# Patient Record
Sex: MALE | Race: WHITE
[De-identification: names, ages, dates, MRNs, and addresses within clinical notes are randomized per-mention and may not be internally consistent; named-entity substitution may affect disease eponyms.]

---

## 2017-01-27 ENCOUNTER — HOSPITAL ENCOUNTER (OUTPATIENT)
Dept: HOSPITAL 47 - LABWHC1 | Age: 42
Discharge: HOME | End: 2017-01-27
Payer: COMMERCIAL

## 2017-01-27 DIAGNOSIS — E29.1: ICD-10-CM

## 2017-01-27 DIAGNOSIS — E11.65: Primary | ICD-10-CM

## 2017-01-27 LAB
ALP SERPL-CCNC: 50 U/L (ref 38–126)
ALT SERPL-CCNC: 92 U/L (ref 21–72)
ANION GAP SERPL CALC-SCNC: 11 MMOL/L
AST SERPL-CCNC: 46 U/L (ref 17–59)
BUN SERPL-SCNC: 20 MG/DL (ref 9–20)
CALCIUM SPEC-MCNC: 9.4 MG/DL (ref 8.4–10.2)
CH: 30.4
CHCM: 34.4
CHLORIDE SERPL-SCNC: 102 MMOL/L (ref 98–107)
CHOLEST SERPL-MCNC: 207 MG/DL (ref ?–200)
CO2 SERPL-SCNC: 25 MMOL/L (ref 22–30)
ERYTHROCYTE [DISTWIDTH] IN BLOOD BY AUTOMATED COUNT: 6.07 M/UL (ref 4.3–5.9)
ERYTHROCYTE [DISTWIDTH] IN BLOOD: 13.7 % (ref 11.5–15.5)
GLUCOSE SERPL-MCNC: 138 MG/DL (ref 74–99)
HCT VFR BLD AUTO: 54 % (ref 39–53)
HDLC SERPL-MCNC: 33 MG/DL (ref 40–60)
HDW: 2.98
HGB BLD-MCNC: 17.9 GM/DL (ref 13–17.5)
MCH RBC QN AUTO: 29.5 PG (ref 25–35)
MCHC RBC AUTO-ENTMCNC: 33.1 G/DL (ref 31–37)
MCV RBC AUTO: 89 FL (ref 80–100)
NON-AFRICAN AMERICAN GFR(MDRD): >60
POTASSIUM SERPL-SCNC: 4.9 MMOL/L (ref 3.5–5.1)
PROT SERPL-MCNC: 6.8 G/DL (ref 6.3–8.2)
PSA SERPL-MCNC: 1.46 NG/ML (ref 0–4)
SODIUM SERPL-SCNC: 138 MMOL/L (ref 137–145)
TRIGL SERPL-MCNC: 225 MG/DL (ref ?–150)
WBC # BLD AUTO: 7.5 K/UL (ref 3.8–10.6)

## 2017-01-27 PROCEDURE — 84153 ASSAY OF PSA TOTAL: CPT

## 2017-01-27 PROCEDURE — 84403 ASSAY OF TOTAL TESTOSTERONE: CPT

## 2017-01-27 PROCEDURE — 82043 UR ALBUMIN QUANTITATIVE: CPT

## 2017-01-27 PROCEDURE — 80053 COMPREHEN METABOLIC PANEL: CPT

## 2017-01-27 PROCEDURE — 85027 COMPLETE CBC AUTOMATED: CPT

## 2017-01-27 PROCEDURE — 36415 COLL VENOUS BLD VENIPUNCTURE: CPT

## 2017-01-27 PROCEDURE — 80061 LIPID PANEL: CPT

## 2017-07-05 ENCOUNTER — HOSPITAL ENCOUNTER (OUTPATIENT)
Dept: HOSPITAL 47 - LABWHC1 | Age: 42
Discharge: HOME | End: 2017-07-05
Payer: COMMERCIAL

## 2017-07-05 DIAGNOSIS — E11.65: Primary | ICD-10-CM

## 2017-07-05 LAB
ALP SERPL-CCNC: 53 U/L (ref 38–126)
ALT SERPL-CCNC: 100 U/L (ref 21–72)
ANION GAP SERPL CALC-SCNC: 8 MMOL/L
AST SERPL-CCNC: 43 U/L (ref 17–59)
BUN SERPL-SCNC: 15 MG/DL (ref 9–20)
CALCIUM SPEC-MCNC: 9.2 MG/DL (ref 8.4–10.2)
CHLORIDE SERPL-SCNC: 101 MMOL/L (ref 98–107)
CHOLEST SERPL-MCNC: 210 MG/DL (ref ?–200)
CO2 SERPL-SCNC: 30 MMOL/L (ref 22–30)
GLUCOSE SERPL-MCNC: 188 MG/DL (ref 74–99)
HDLC SERPL-MCNC: 37 MG/DL (ref 40–60)
NON-AFRICAN AMERICAN GFR(MDRD): >60
POTASSIUM SERPL-SCNC: 4.7 MMOL/L (ref 3.5–5.1)
PROT SERPL-MCNC: 6.8 G/DL (ref 6.3–8.2)
SODIUM SERPL-SCNC: 139 MMOL/L (ref 137–145)
TRIGL SERPL-MCNC: 240 MG/DL (ref ?–150)

## 2017-07-05 PROCEDURE — 80061 LIPID PANEL: CPT

## 2017-07-05 PROCEDURE — 36415 COLL VENOUS BLD VENIPUNCTURE: CPT

## 2017-07-05 PROCEDURE — 80053 COMPREHEN METABOLIC PANEL: CPT

## 2018-01-06 ENCOUNTER — HOSPITAL ENCOUNTER (OUTPATIENT)
Dept: HOSPITAL 47 - LABWHC1 | Age: 43
Discharge: HOME | End: 2018-01-06
Payer: COMMERCIAL

## 2018-01-06 DIAGNOSIS — E11.65: Primary | ICD-10-CM

## 2018-01-06 DIAGNOSIS — E29.1: ICD-10-CM

## 2018-01-06 LAB
ALBUMIN SERPL-MCNC: 4.3 G/DL (ref 3.5–5)
ALP SERPL-CCNC: 56 U/L (ref 38–126)
ALT SERPL-CCNC: 113 U/L (ref 21–72)
ANION GAP SERPL CALC-SCNC: 10 MMOL/L
AST SERPL-CCNC: 40 U/L (ref 17–59)
BUN SERPL-SCNC: 21 MG/DL (ref 9–20)
CALCIUM SPEC-MCNC: 9.8 MG/DL (ref 8.4–10.2)
CHLORIDE SERPL-SCNC: 97 MMOL/L (ref 98–107)
CHOLEST SERPL-MCNC: 185 MG/DL (ref ?–200)
CO2 SERPL-SCNC: 32 MMOL/L (ref 22–30)
ERYTHROCYTE [DISTWIDTH] IN BLOOD BY AUTOMATED COUNT: 5.78 M/UL (ref 4.3–5.9)
ERYTHROCYTE [DISTWIDTH] IN BLOOD: 13.8 % (ref 11.5–15.5)
GLUCOSE SERPL-MCNC: 296 MG/DL (ref 74–99)
HCT VFR BLD AUTO: 51.8 % (ref 39–53)
HDLC SERPL-MCNC: 29 MG/DL (ref 40–60)
HGB BLD-MCNC: 17.3 GM/DL (ref 13–17.5)
LDLC SERPL CALC-MCNC: 103 MG/DL (ref 0–99)
MCH RBC QN AUTO: 29.8 PG (ref 25–35)
MCHC RBC AUTO-ENTMCNC: 33.3 G/DL (ref 31–37)
MCV RBC AUTO: 89.5 FL (ref 80–100)
PLATELET # BLD AUTO: 290 K/UL (ref 150–450)
POTASSIUM SERPL-SCNC: 5.6 MMOL/L (ref 3.5–5.1)
PROT SERPL-MCNC: 6.8 G/DL (ref 6.3–8.2)
PSA SERPL-MCNC: 2.4 NG/ML (ref 0–4)
SODIUM SERPL-SCNC: 139 MMOL/L (ref 137–145)
TRIGL SERPL-MCNC: 267 MG/DL (ref ?–150)
WBC # BLD AUTO: 6.3 K/UL (ref 3.8–10.6)

## 2018-01-06 PROCEDURE — 80061 LIPID PANEL: CPT

## 2018-01-06 PROCEDURE — 82043 UR ALBUMIN QUANTITATIVE: CPT

## 2018-01-06 PROCEDURE — 36415 COLL VENOUS BLD VENIPUNCTURE: CPT

## 2018-01-06 PROCEDURE — 85027 COMPLETE CBC AUTOMATED: CPT

## 2018-01-06 PROCEDURE — 80053 COMPREHEN METABOLIC PANEL: CPT

## 2018-01-06 PROCEDURE — 82570 ASSAY OF URINE CREATININE: CPT

## 2018-01-06 PROCEDURE — 84153 ASSAY OF PSA TOTAL: CPT

## 2018-10-24 ENCOUNTER — HOSPITAL ENCOUNTER (OUTPATIENT)
Dept: HOSPITAL 47 - LABWHC1 | Age: 43
End: 2018-10-24
Payer: COMMERCIAL

## 2018-10-24 DIAGNOSIS — E11.65: Primary | ICD-10-CM

## 2018-10-24 DIAGNOSIS — E29.1: ICD-10-CM

## 2018-10-24 LAB
ALBUMIN SERPL-MCNC: 4.7 G/DL (ref 3.8–4.9)
ALBUMIN/GLOB SERPL: 2.24 G/DL (ref 1.2–2.1)
ALP SERPL-CCNC: 57 U/L (ref 41–126)
ALT SERPL-CCNC: 64 U/L (ref 10–49)
ANION GAP SERPL CALC-SCNC: 12.3 MMOL/L (ref 4–12)
AST SERPL-CCNC: 36 U/L (ref 14–35)
BUN SERPL-SCNC: 18 MG/DL (ref 9–27)
CALCIUM SPEC-MCNC: 9.7 MG/DL (ref 8.7–10.3)
CHLORIDE SERPL-SCNC: 98 MMOL/L (ref 96–109)
CHOLEST SERPL-MCNC: 300 MG/DL (ref 0–200)
CO2 SERPL-SCNC: 26.7 MMOL/L (ref 21.6–31.8)
ERYTHROCYTE [DISTWIDTH] IN BLOOD BY AUTOMATED COUNT: 5.87 M/UL (ref 4.3–5.9)
ERYTHROCYTE [DISTWIDTH] IN BLOOD: 14.5 % (ref 11.5–15.5)
GLOBULIN SER CALC-MCNC: 2.1 G/DL (ref 2.1–3.7)
GLUCOSE SERPL-MCNC: 234 MG/DL (ref 70–110)
HBA1C MFR BLD: 14.6 % (ref 4–6)
HCT VFR BLD AUTO: 52.4 % (ref 39–53)
HDLC SERPL-MCNC: 36 MG/DL (ref 40–60)
HGB BLD-MCNC: 17.8 GM/DL (ref 13–17.5)
MCH RBC QN AUTO: 30.3 PG (ref 25–35)
MCHC RBC AUTO-ENTMCNC: 33.9 G/DL (ref 31–37)
MCV RBC AUTO: 89.2 FL (ref 80–100)
PLATELET # BLD AUTO: 312 K/UL (ref 150–450)
POTASSIUM SERPL-SCNC: 5 MMOL/L (ref 3.5–5.5)
PROT SERPL-MCNC: 6.8 G/DL (ref 6.2–8.2)
SODIUM SERPL-SCNC: 137 MMOL/L (ref 135–145)
TRIGL SERPL-MCNC: 633 MG/DL (ref 0–149)
WBC # BLD AUTO: 6.6 K/UL (ref 3.8–10.6)

## 2018-10-24 PROCEDURE — 85027 COMPLETE CBC AUTOMATED: CPT

## 2018-10-24 PROCEDURE — 80053 COMPREHEN METABOLIC PANEL: CPT

## 2018-10-24 PROCEDURE — 36415 COLL VENOUS BLD VENIPUNCTURE: CPT

## 2018-10-24 PROCEDURE — 83721 ASSAY OF BLOOD LIPOPROTEIN: CPT

## 2018-10-24 PROCEDURE — 82570 ASSAY OF URINE CREATININE: CPT

## 2018-10-24 PROCEDURE — 82043 UR ALBUMIN QUANTITATIVE: CPT

## 2018-10-24 PROCEDURE — 80061 LIPID PANEL: CPT

## 2018-10-24 PROCEDURE — 83036 HEMOGLOBIN GLYCOSYLATED A1C: CPT

## 2018-10-24 PROCEDURE — 84403 ASSAY OF TOTAL TESTOSTERONE: CPT

## 2018-12-26 ENCOUNTER — HOSPITAL ENCOUNTER (OUTPATIENT)
Dept: HOSPITAL 47 - EC | Age: 43
Setting detail: OBSERVATION
LOS: 1 days | Discharge: HOME | End: 2018-12-27
Attending: FAMILY MEDICINE | Admitting: FAMILY MEDICINE
Payer: COMMERCIAL

## 2018-12-26 VITALS — BODY MASS INDEX: 30.1 KG/M2

## 2018-12-26 DIAGNOSIS — I10: ICD-10-CM

## 2018-12-26 DIAGNOSIS — E78.5: ICD-10-CM

## 2018-12-26 DIAGNOSIS — Z79.899: ICD-10-CM

## 2018-12-26 DIAGNOSIS — Z82.49: ICD-10-CM

## 2018-12-26 DIAGNOSIS — G25.81: ICD-10-CM

## 2018-12-26 DIAGNOSIS — E11.9: ICD-10-CM

## 2018-12-26 DIAGNOSIS — R07.89: Primary | ICD-10-CM

## 2018-12-26 DIAGNOSIS — E66.9: ICD-10-CM

## 2018-12-26 DIAGNOSIS — E29.1: ICD-10-CM

## 2018-12-26 DIAGNOSIS — F41.8: ICD-10-CM

## 2018-12-26 DIAGNOSIS — Z79.4: ICD-10-CM

## 2018-12-26 LAB
ALBUMIN SERPL-MCNC: 4.2 G/DL (ref 3.5–5)
ALP SERPL-CCNC: 61 U/L (ref 38–126)
ALT SERPL-CCNC: 57 U/L (ref 21–72)
ANION GAP SERPL CALC-SCNC: 13 MMOL/L
APTT BLD: 22.2 SEC (ref 22–30)
AST SERPL-CCNC: 27 U/L (ref 17–59)
BASOPHILS # BLD AUTO: 0.1 K/UL (ref 0–0.2)
BASOPHILS NFR BLD AUTO: 1 %
BUN SERPL-SCNC: 19 MG/DL (ref 9–20)
CALCIUM SPEC-MCNC: 9.7 MG/DL (ref 8.4–10.2)
CHLORIDE SERPL-SCNC: 102 MMOL/L (ref 98–107)
CK SERPL-CCNC: 107 U/L (ref 55–170)
CK SERPL-CCNC: 146 U/L (ref 55–170)
CO2 SERPL-SCNC: 23 MMOL/L (ref 22–30)
EOSINOPHIL # BLD AUTO: 0.1 K/UL (ref 0–0.7)
EOSINOPHIL NFR BLD AUTO: 2 %
ERYTHROCYTE [DISTWIDTH] IN BLOOD BY AUTOMATED COUNT: 5.52 M/UL (ref 4.3–5.9)
ERYTHROCYTE [DISTWIDTH] IN BLOOD: 14 % (ref 11.5–15.5)
GLUCOSE BLD-MCNC: 209 MG/DL (ref 75–99)
GLUCOSE BLD-MCNC: 306 MG/DL (ref 75–99)
GLUCOSE SERPL-MCNC: 343 MG/DL (ref 74–99)
HCT VFR BLD AUTO: 49.5 % (ref 39–53)
HGB BLD-MCNC: 16.5 GM/DL (ref 13–17.5)
INR PPP: 0.9 (ref ?–1.2)
LYMPHOCYTES # SPEC AUTO: 1.7 K/UL (ref 1–4.8)
LYMPHOCYTES NFR SPEC AUTO: 25 %
MAGNESIUM SPEC-SCNC: 2 MG/DL (ref 1.6–2.3)
MCH RBC QN AUTO: 29.9 PG (ref 25–35)
MCHC RBC AUTO-ENTMCNC: 33.3 G/DL (ref 31–37)
MCV RBC AUTO: 89.7 FL (ref 80–100)
MONOCYTES # BLD AUTO: 0.3 K/UL (ref 0–1)
MONOCYTES NFR BLD AUTO: 4 %
NEUTROPHILS # BLD AUTO: 4.7 K/UL (ref 1.3–7.7)
NEUTROPHILS NFR BLD AUTO: 66 %
PLATELET # BLD AUTO: 305 K/UL (ref 150–450)
POTASSIUM SERPL-SCNC: 4.6 MMOL/L (ref 3.5–5.1)
PROT SERPL-MCNC: 7 G/DL (ref 6.3–8.2)
PT BLD: 9.6 SEC (ref 9–12)
SODIUM SERPL-SCNC: 138 MMOL/L (ref 137–145)
TROPONIN I SERPL-MCNC: <0.012 NG/ML (ref 0–0.03)
TROPONIN I SERPL-MCNC: <0.012 NG/ML (ref 0–0.03)
WBC # BLD AUTO: 7 K/UL (ref 3.8–10.6)

## 2018-12-26 PROCEDURE — 36415 COLL VENOUS BLD VENIPUNCTURE: CPT

## 2018-12-26 PROCEDURE — 85730 THROMBOPLASTIN TIME PARTIAL: CPT

## 2018-12-26 PROCEDURE — 93351 STRESS TTE COMPLETE: CPT

## 2018-12-26 PROCEDURE — 96366 THER/PROPH/DIAG IV INF ADDON: CPT

## 2018-12-26 PROCEDURE — 96376 TX/PRO/DX INJ SAME DRUG ADON: CPT

## 2018-12-26 PROCEDURE — 93005 ELECTROCARDIOGRAM TRACING: CPT

## 2018-12-26 PROCEDURE — 85610 PROTHROMBIN TIME: CPT

## 2018-12-26 PROCEDURE — 84484 ASSAY OF TROPONIN QUANT: CPT

## 2018-12-26 PROCEDURE — 85025 COMPLETE CBC W/AUTO DIFF WBC: CPT

## 2018-12-26 PROCEDURE — 80053 COMPREHEN METABOLIC PANEL: CPT

## 2018-12-26 PROCEDURE — 82553 CREATINE MB FRACTION: CPT

## 2018-12-26 PROCEDURE — 99285 EMERGENCY DEPT VISIT HI MDM: CPT

## 2018-12-26 PROCEDURE — 71046 X-RAY EXAM CHEST 2 VIEWS: CPT

## 2018-12-26 PROCEDURE — 83735 ASSAY OF MAGNESIUM: CPT

## 2018-12-26 PROCEDURE — 93306 TTE W/DOPPLER COMPLETE: CPT

## 2018-12-26 PROCEDURE — 80061 LIPID PANEL: CPT

## 2018-12-26 PROCEDURE — 96365 THER/PROPH/DIAG IV INF INIT: CPT

## 2018-12-26 PROCEDURE — 82550 ASSAY OF CK (CPK): CPT

## 2018-12-26 RX ADMIN — HEPARIN SODIUM PRN UNIT: 5000 INJECTION, SOLUTION INTRAVENOUS; SUBCUTANEOUS at 21:43

## 2018-12-26 RX ADMIN — PRAMIPEXOLE DIHYDROCHLORIDE SCH MG: 0.5 TABLET ORAL at 21:12

## 2018-12-26 RX ADMIN — PRAMIPEXOLE DIHYDROCHLORIDE SCH: 0.5 TABLET ORAL at 16:37

## 2018-12-26 RX ADMIN — INSULIN ASPART SCH UNIT: 100 INJECTION, SOLUTION INTRAVENOUS; SUBCUTANEOUS at 16:50

## 2018-12-26 NOTE — P.HPIM
History of Present Illness


H&P Date: 12/26/18


Chief Complaint: Chest pain





43-year-old male with PMH of Dillon-Parkinson-White status post ablation at 16, 

hypertension, uncontrolled diabetes mellitus, hyperlipidemia, low testosterone, 

restless leg syndrome, anxiety and depression presents the ED for chest pain.





Patient states that the chest pain started around 12:30 PM as he was sitting at 

his desk watching TV.  Pain was left-sided and intermittent, lasting for a few 

minutes at a time.  Pain is 10 out of 10 in severity.  Patient describes the 

pain as sharp and stabbing in nature.  Patient reported that the pain radiated 

to the left arm which led to some tingling in his left fingers.  He denies any 

alleviating or aggravating factors.  


Patient denies any headaches, lower extremity edema, nausea, vomiting, fever, 

cough, changes in urination or bowel habits.  No changes in appetite or weight.


Patient does however endorse palpitations and shortness of breath that started 

with his chest pain.





Patient reports previous incidents in 2016, initially found to have elevated 

troponin which normalized on repeat blood draw, he was told that this was 

related to his anxiety.





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





Patient reports uncontrolled diabetes, A1c greater than 11 at his PCPs clinic, 

recently started on insulin.


Patient also reports recently being started on Lipitor as his lipid panel was 

extremely elevated.


Patient also reports an impressive family history of early cardiac disease, MIs 

in the 50s on his mother's side.





In the ED, CBC and CMP were unremarkable except for a glucose of 343.  Initial 

troponin was less than 0.012, EKG showing sinus tachycardia.  Patient is 

admitted for chest pain, rule out acute coronary syndrome.  He started on 

heparin drip with cardiology on consult.





Review of Systems


All systems: negative





Past Medical History


Past Medical History: Diabetes Mellitus, Hyperlipidemia


Additional Past Medical History / Comment(s): RLS; Low testosterone, restless 

leg syndrome


History of Any Multi-Drug Resistant Organisms: None Reported


Past Surgical History: Orthopedic Surgery


Past Psychological History: Anxiety, Depression


Smoking Status: Never smoker


Past Alcohol Use History: Occasional


Past Drug Use History: None Reported





Medications and Allergies


 Home Medications











 Medication  Instructions  Recorded  Confirmed  Type


 


Desvenlafaxine Succinate [Pristiq 100 mg PO DAILY 07/25/15 12/26/18 History





ER]    


 


Gabapentin [Neurontin] 300 mg PO HS 07/25/15 12/26/18 History


 


Testosterone Cypionate 200 mg IM Q14D 07/25/15 12/26/18 History





[Depo-Testosterone]    


 


Atorvastatin [Lipitor] 40 mg PO DAILY 12/26/18 12/26/18 History


 


Dapagliflozin Propanediol [Farxiga] 10 mg PO DAILY 12/26/18 12/26/18 History


 


Insulin Glargine,Hum.rec.anlog 20 units SQ DAILY 12/26/18 12/26/18 History





[Toujeo Solostar]    


 


Lisdexamfetamine Dimesylate 50 mg PO QAM 12/26/18 12/26/18 History





[Vyvanse]    


 


Pramipexole [Mirapex] 0.5 mg PO TID 12/26/18 12/26/18 History


 


lamoTRIgine [LaMICtal] 200 mg PO DAILY 12/26/18 12/26/18 History


 


sitaGLIPtin PHOS/metFORMIN HCL 1 tab PO BID 12/26/18 12/26/18 History





[Janumet 50-1,000 mg Tablet]    











 Allergies











Allergy/AdvReac Type Severity Reaction Status Date / Time


 


No Known Allergies Allergy   Verified 12/26/18 13:43














Physical Exam


Vitals: 


 Vital Signs











  Temp Pulse Resp BP Pulse Ox


 


 12/26/18 15:00   105 H  11 L  129/80  96


 


 12/26/18 14:55   109 H  20  129/80  98


 


 12/26/18 14:00   112 H  15   96


 


 12/26/18 13:21   113 H  12   97


 


 12/26/18 13:10  98 F  116 H  18  143/83  98








 Intake and Output











 12/26/18 12/26/18 12/26/18





 06:59 14:59 22:59


 


Other:   


 


  Weight  97.522 kg 














General: [non toxic], [no distress], [appears at stated age]


Derm: [warm], [dry]


Head: [atraumatic], [normocephalic], [symmetric]


Eyes: [EOMI], [no lid lag], [anicteric sclera]


Mouth: [no lip lesion], [mucus membranes moist]


Cardiovascular: [S1S2 reg], [tachycardia], [positive DP pulse bilateral]


Lungs: [CTA bilateral], [no rhonchi, no rales] , [no accessory muscle use]


Abdominal: [soft], [ nontender to palpation], [no guarding], [no appreciable 

organomegaly]


Ext: [no gross muscle atrophy], [no edema], [no contractures]


Neuro: [no focal neuro deficits]


Psych: [Alert], [oriented], [appropriate affect] 





Results


CBC & Chem 7: 


 12/26/18 13:55





 12/26/18 13:55


Labs: 


 Abnormal Lab Results - Last 24 Hours (Table)











  12/26/18 Range/Units





  13:55 


 


Glucose  343 H  (74-99)  mg/dL














Thrombosis Risk Factor Assmnt





- Choose All That Apply


Any of the Below Risk Factors Present?: Yes


Each Factor Represents 1 point: Age 41-60 years


Thrombosis Risk Factor Assessment Total Risk Factor Score: 1


Thrombosis Risk Factor Assessment Level: Low Risk





Assessment and Plan


Assessment: 





Assessment and Plan





1. Chest pain: Concerns for ACS with risk factors. Patient is afebrile with no 

leukocytosis.. Troponin < 0.012 x 1, EKG showing sinus tachycardia. CXR is 

negative. Pain management with Tylenol, Nitrostat PRN and Morphine 2 mg IV Q4H 

PRN for breakthrough. Start  mg PO QD. Trend 2 Trop/EKG to r/o ACS. 

Heparin drip pending Cardiology evaluation. Telemetry monitoring. FU 

Echocardiogram, Cardiology consult





2. Diabetes Mellitus: POC glucose 343. Start Levemir 20 units QHS, ISS. 

Hypoglycemic precautions. Accuchecks with meals. 


3. Hyperlipidemia: Continue  mg PO QD and Lipitor 40 mg PO QHS. HEART 

healthy diet. FU Lipid panel


4. Restless leg syndrome: Stable. Continue Pramipexole 0.5 mg PO TID.


5. Low testosterone: Stable. Continue testosterone biweekly injections.


6. Depression: Stable. Continue Pristiq  mg PO QD.


7. DVT/GI Prophylaxis: Heparin drip.

## 2018-12-26 NOTE — ED
Chest Pain HPI





- General


Chief Complaint: Chest Pain


Stated Complaint: Chest pain,tightness. Arm tingling


Time Seen by Provider: 12/26/18 13:14


Source: patient, RN notes reviewed


Mode of arrival: wheelchair


Limitations: no limitations





- History of Present Illness


Initial Comments: 





43-year-old male presents emergency Department chief complaint chest pain.  

Patient states this started just prior arrival.  Patient states the pain is in 

the central to left-sided and radiates down his left arm with tingling.  

Patient states that he is a diabetic, has history of hyperlipidemia, low 

testosterone, depression.  Patient states he felt short of breath and the pain 

started but he does not feel short breath at this time.  Denies any back pain, 

headache or dizziness.  Patient states she had symptoms like this in the past 

in which she's had elevated troponins.  Patient states that he may have had a 

stress test a few years ago but no prior cardiac cath.





- Related Data


 Home Medications











 Medication  Instructions  Recorded  Confirmed


 


Desvenlafaxine Succinate [Pristiq 100 mg PO DAILY 07/25/15 12/26/18





ER]   


 


Gabapentin [Neurontin] 300 mg PO HS 07/25/15 12/26/18


 


Testosterone Cypionate 200 mg IM Q14D 07/25/15 12/26/18





[Depo-Testosterone]   


 


Atorvastatin [Lipitor] 40 mg PO DAILY 12/26/18 12/26/18


 


Dapagliflozin Propanediol [Farxiga] 10 mg PO DAILY 12/26/18 12/26/18


 


Insulin Glargine,Hum.rec.anlog 20 units SQ DAILY 12/26/18 12/26/18





[Tounello Solgarfieldar]   


 


Lisdexamfetamine Dimesylate 50 mg PO QAM 12/26/18 12/26/18





[Vyvanse]   


 


Pramipexole [Mirapex] 0.5 mg PO TID 12/26/18 12/26/18


 


lamoTRIgine [LaMICtal] 200 mg PO DAILY 12/26/18 12/26/18


 


sitaGLIPtin PHOS/metFORMIN HCL 1 tab PO BID 12/26/18 12/26/18





[Janumet 50-1,000 mg Tablet]   











 Allergies











Allergy/AdvReac Type Severity Reaction Status Date / Time


 


No Known Allergies Allergy   Verified 12/26/18 13:43














Review of Systems


ROS Statement: 


Those systems with pertinent positive or pertinent negative responses have been 

documented in the HPI.





ROS Other: All systems not noted in ROS Statement are negative.





EKG Findings





- EKG Comments:


EKG Findings:: EKG performed at 13:19 sinus tachycardia with rate 113  

QRS 98 QT//466





Past Medical History


Past Medical History: Diabetes Mellitus, Hyperlipidemia


Additional Past Medical History / Comment(s): RLS; Low testosterone, restless 

leg syndrome


History of Any Multi-Drug Resistant Organisms: None Reported


Past Surgical History: Orthopedic Surgery


Past Psychological History: Anxiety, Depression


Smoking Status: Never smoker


Past Alcohol Use History: Occasional


Past Drug Use History: None Reported





General Exam


Limitations: no limitations


General appearance: alert, in no apparent distress


Head exam: Present: atraumatic, normocephalic, normal inspection


Neck exam: Present: normal inspection.  Absent: tenderness, meningismus, 

lymphadenopathy


Respiratory exam: Present: normal lung sounds bilaterally.  Absent: respiratory 

distress, wheezes, rales, rhonchi, stridor


Cardiovascular Exam: Present: normal rhythm, tachycardia, normal heart sounds.  

Absent: systolic murmur, diastolic murmur, rubs, gallop, clicks


GI/Abdominal exam: Present: soft, normal bowel sounds.  Absent: distended, 

tenderness, guarding, rebound, rigid





Course


 Vital Signs











  12/26/18 12/26/18





  13:10 14:55


 


Temperature 98 F 


 


Pulse Rate 116 H 109 H


 


Respiratory 18 20





Rate  


 


Blood Pressure 143/83 129/80


 


O2 Sat by Pulse 98 98





Oximetry  














Chest Pain MDM





- MDM





43-year-old male presented for chest pain.  Initial troponin is negative EKG 

does not show an acute changes patient has multiple risk factors including 

hyperlipidemia, uncontrolled diabetes, family heart history.  Patient will be 

admitted for repeat cardiac enzymes, cardiology evaluation





Disposition


Clinical Impression: 


 Chest pain





Disposition: ADMITTED AS IP TO THIS HOSP


Condition: Fair


Referrals: 


Tiffanie Talavera MD [Primary Care Provider] - 1-2 days

## 2018-12-26 NOTE — XR
EXAMINATION TYPE: XR chest 2V

 

DATE OF EXAM: 12/26/2018

 

COMPARISON: NONE

 

HISTORY: Chest pain down both arms. 

 

TECHNIQUE:  Frontal and lateral views of the chest are obtained.

 

FINDINGS:  There is no focal air space opacity, pleural effusion, or pneumothorax seen.  The cardiac 
silhouette size is within normal limits.   The osseous structures are intact.

 

IMPRESSION:  No acute process.

## 2018-12-27 VITALS
RESPIRATION RATE: 16 BRPM | SYSTOLIC BLOOD PRESSURE: 112 MMHG | HEART RATE: 89 BPM | TEMPERATURE: 98.3 F | DIASTOLIC BLOOD PRESSURE: 76 MMHG

## 2018-12-27 LAB
CHOLEST SERPL-MCNC: 148 MG/DL (ref ?–200)
CK SERPL-CCNC: 106 U/L (ref 55–170)
GLUCOSE BLD-MCNC: 157 MG/DL (ref 75–99)
GLUCOSE BLD-MCNC: 166 MG/DL (ref 75–99)
GLUCOSE BLD-MCNC: 293 MG/DL (ref 75–99)
HDLC SERPL-MCNC: 33 MG/DL (ref 40–60)
LDLC SERPL CALC-MCNC: 74 MG/DL (ref 0–99)
TRIGL SERPL-MCNC: 206 MG/DL (ref ?–150)
TROPONIN I SERPL-MCNC: <0.012 NG/ML (ref 0–0.03)

## 2018-12-27 RX ADMIN — INSULIN ASPART SCH: 100 INJECTION, SOLUTION INTRAVENOUS; SUBCUTANEOUS at 16:04

## 2018-12-27 RX ADMIN — HEPARIN SODIUM PRN UNIT: 5000 INJECTION, SOLUTION INTRAVENOUS; SUBCUTANEOUS at 03:24

## 2018-12-27 RX ADMIN — PRAMIPEXOLE DIHYDROCHLORIDE SCH: 0.5 TABLET ORAL at 18:09

## 2018-12-27 RX ADMIN — INSULIN ASPART SCH: 100 INJECTION, SOLUTION INTRAVENOUS; SUBCUTANEOUS at 08:40

## 2018-12-27 RX ADMIN — PRAMIPEXOLE DIHYDROCHLORIDE SCH: 0.5 TABLET ORAL at 12:41

## 2018-12-27 RX ADMIN — INSULIN ASPART SCH UNIT: 100 INJECTION, SOLUTION INTRAVENOUS; SUBCUTANEOUS at 17:28

## 2018-12-27 NOTE — P.CRDCN
History of Present Illness


History of present illness: 


This is a pleasant 43-year-old  male past medical history significant 

for Dillon-Parkinson-White syndrome status post ablation, diabetes mellitus and 

dyslipidemia.  He denies history of coronary artery disease.  He does not 

follow regularly with a cardiologist.  We have been asked to see him in 

consultation for chest discomfort.  He states while sitting at his desk 

yesterday he developed a heavy sensation in the left precordial region with 

radiation to the left arm and hand.  He states he felt tingling in his fingers.

  He also had mild shortness of breath.  No radiation to the back, neck or jaw.

  He denies dizziness, palpitations, nausea, vomiting or diaphoresis.  The 

symptoms persisted for a couple of minutes and ultimately subsided on her own.  

Upon arrival to the emergency department he still felt a dull ache in the 

chest.  He was given sublingual nitroglycerin and his discomfort is completely 

subsided.  There is been no return of chest discomfort since admission to the 

hospital.  He states he underwent an ablation for Dillon-Parkinson-White he was 

16 years old.  He also states he is under a significant amount of stress at 

work with fear of losing his job as well as having significant financial 

trouble at home.





EKG reveals sinus mechanism with no acute ST or T wave abnormalities noted.


Chest x-ray is negative for acute cardiopulmonary process.


Laboratory data reviewed, WBC 7, hemoglobin 16.5, platelets 305, sodium 138, 

potassium 4.6, creatinine 0.72, magnesium 2.0, cardiac enzymes negative 3, LDL 

74 and HDL 33.


Current cardiac medications include atorvastatin 40 mg daily. 


He has a negative stress test performed in 2015.





At the time of my exam:


CONSTITUTIONAL: Denies fever. Denies chills.


EYES: Denies blurred vision. Denies vision changes. Denies eye pain.


EARS, NOSE, MOUTH & THROAT: Denies headache. Denies sore throat. Denies ear 

pain.


CARDIOVASCULAR: Denies chest pain. Denies shortness of breath. Denies 

orthopnea. Denies PND. Denies palpitations.


RESPIRATORY: Denies cough. 


GASTROINTESTINAL: Denies abdominal pain. Denies diarrhea. Denies constipation. 

Denies nausea. Denies vomiting.


MUSCULOSKELETAL: Denies myalgias.


INTEGUMENTARY: Denies pruitis. Denies rash.


NEUROLOGIC: Denies numbness. Denies tingling. Denies weakness.


PSYCHIATRIC: Denies anxiety. Denies depression.


ENDOCRINE: Denies fatigue. Denies weight change. Denies polydipsia. Denies 

polyurina.


GENITOURINARY: Denies burning, hematuria or urgency with micturation.


HEMATOLOGIC: Denies history of anemia. Denies bleeding. 





Blood pressure 111/72 heart rate 89 afebrile maintaining oxygen saturation on 

room air


GENERAL: This is a 43-year-old  male in no apparent distress at the 

time of my examination.


HEENT: Head is atraumatic, normocephalic. Pupils are equal, round. Sclerae 

anicteric. Conjunctivae are clear. Mucous membranes of the mouth are moist. 

Neck is supple. There is no jugular venous distention. No carotid bruit is 

heard.


LUNGS: Clear to auscultation no wheezes, rales or rhonchi. No chest wall 

tenderness is noted on palpation or with deep breathing.


HEART: Regular rate and rhythm without murmurs, rubs or gallops. S1 and S2 

heard.


ABDOMEN: Soft, nontender. Bowel sounds are heard. No organomegaly noted.


EXTREMITIES: No evidence of peripheral edema and no calf tenderness noted.


VASCULAR: Radial and dorsalis pedis pulses palpated, no evidence of clubbing.  


NEUROLOGIC: Patient is awake, alert and oriented x3.


 


ASSESSMENT


Precordial chest pain, atypical.  An acute coronary event has been ruled out 

with no EKG evidence of ischemia and negative cardiac enzymes.


Dyslipidemia, controlled on atorvastatin


Diabetes mellitus


Obesity, BMI 30





PLAN


An acute coronary event has been ruled out with no EKG evidence of ischemia and 

negative cardiac enzymes.


Discontinue heparin infusion and Nitropaste.


Obtain 2-D echocardiogram and Doppler study to assess cardiac structure and 

function.


Perform stress echocardiogram to assess for stress-induced cardiac ischemia.


If stress test is normal he is stable from a cardiac perspective.  Follow-up 

with Dr. Mcbride in 2-3 weeks.


Recommend lifestyle modifications such as diet and exercise for weight loss.


Thank you kindly for this consultation.








Nurse Practitioner note has been reviewed, I agree with a documented findings 

and plan of care.  Patient was seen and examined.








Past Medical History


Past Medical History: Diabetes Mellitus, Hyperlipidemia


Additional Past Medical History / Comment(s): RLS; Low testosterone, restless 

leg syndrome


History of Any Multi-Drug Resistant Organisms: None Reported


Past Surgical History: Orthopedic Surgery


Past Psychological History: Anxiety, Depression


Smoking Status: Never smoker


Past Alcohol Use History: Occasional


Past Drug Use History: None Reported





Medications and Allergies


 Home Medications











 Medication  Instructions  Recorded  Confirmed  Type


 


Desvenlafaxine Succinate [Pristiq 100 mg PO DAILY 07/25/15 12/26/18 History





ER]    


 


Gabapentin [Neurontin] 300 mg PO HS 07/25/15 12/26/18 History


 


Testosterone Cypionate 200 mg IM Q14D 07/25/15 12/26/18 History





[Depo-Testosterone]    


 


Atorvastatin [Lipitor] 40 mg PO DAILY 12/26/18 12/26/18 History


 


Dapagliflozin Propanediol [Farxiga] 10 mg PO DAILY 12/26/18 12/26/18 History


 


Insulin Glargine,Hum.rec.anlog 20 units SQ DAILY 12/26/18 12/26/18 History





[Toujeo Solostar]    


 


Lisdexamfetamine Dimesylate 50 mg PO QAM 12/26/18 12/26/18 History





[Vyvanse]    


 


Pramipexole [Mirapex] 0.5 mg PO TID 12/26/18 12/26/18 History


 


lamoTRIgine [LaMICtal] 200 mg PO DAILY 12/26/18 12/26/18 History


 


sitaGLIPtin PHOS/metFORMIN HCL 1 tab PO BID 12/26/18 12/26/18 History





[Janumet 50-1,000 mg Tablet]    











 Allergies











Allergy/AdvReac Type Severity Reaction Status Date / Time


 


No Known Allergies Allergy   Verified 12/26/18 13:43














Physical Exam


Vitals: 


 Vital Signs











  Temp Pulse Pulse Resp BP BP Pulse Ox


 


 12/27/18 07:10  97.6 F   89  18   111/72  97


 


 12/27/18 04:00  98.3 F   83  18   110/70  99


 


 12/26/18 23:48    82  18   


 


 12/26/18 23:06  98.2 F   82  18   113/71  95


 


 12/26/18 19:29  98.2 F   98  18   119/78  97


 


 12/26/18 19:20    95  18   


 


 12/26/18 16:15  98.5 F   95  18   128/92  97


 


 12/26/18 15:00   105 H   11 L  129/80   96


 


 12/26/18 14:55   109 H   20  129/80   98


 


 12/26/18 14:00   112 H   15    96


 


 12/26/18 13:21   113 H   12    97


 


 12/26/18 13:10  98 F  116 H   18  143/83   98








 Intake and Output











 12/26/18 12/27/18 12/27/18





 22:59 06:59 14:59


 


Intake Total 872.5 74.994 


 


Balance 872.5 74.994 


 


Intake:   


 


  Intake, IV Titration 50.5 74.994 





  Amount   


 


    Heparin Sod,Pork in 0.45% 50.5 74.994 





    NaCl 25,000 unit In 0.45   





    % NaCl 1 250ml.bag @ 10.   





    26 UNITS/KG/HR 10 mls/hr   





    IV .Q24H [facility] Rx#:   





    [id number]   


 


  Oral 822  


 


Other:   


 


  # Voids  1 


 


  Weight 92.624 kg  














Results





 12/26/18 13:55





 12/26/18 13:55


 Cardiac Enzymes











  12/26/18 12/26/18 12/26/18 Range/Units





  13:55 13:55 19:20 


 


AST   27   (17-59)  U/L


 


CK-MB (CK-2)  2.2   1.5  (0.0-2.4)  ng/mL


 


Troponin I  <0.012   <0.012  (0.000-0.034)  ng/mL














  12/27/18 Range/Units





  02:36 


 


AST   (17-59)  U/L


 


CK-MB (CK-2)  1.4  (0.0-2.4)  ng/mL


 


Troponin I  <0.012  (0.000-0.034)  ng/mL








 Coagulation











  12/26/18 12/26/18 12/27/18 Range/Units





  13:55 19:20 02:36 


 


PT  9.6    (9.0-12.0)  sec


 


APTT  22.2  26.7  39.9 H  (22.0-30.0)  sec








 Lipids











  12/27/18 Range/Units





  02:36 


 


Triglycerides  206 H  (<150)  mg/dL


 


Cholesterol  148  (<200)  mg/dL


 


HDL Cholesterol  33 L  (40-60)  mg/dL








 CBC











  12/26/18 Range/Units





  13:55 


 


WBC  7.0  (3.8-10.6)  k/uL


 


RBC  5.52  (4.30-5.90)  m/uL


 


Hgb  16.5  (13.0-17.5)  gm/dL


 


Hct  49.5  (39.0-53.0)  %


 


Plt Count  305  (150-450)  k/uL








 Comprehensive Metabolic Panel











  12/26/18 Range/Units





  13:55 


 


Sodium  138  (137-145)  mmol/L


 


Potassium  4.6  (3.5-5.1)  mmol/L


 


Chloride  102  ()  mmol/L


 


Carbon Dioxide  23  (22-30)  mmol/L


 


BUN  19  (9-20)  mg/dL


 


Creatinine  0.72  (0.66-1.25)  mg/dL


 


Glucose  343 H  (74-99)  mg/dL


 


Calcium  9.7  (8.4-10.2)  mg/dL


 


AST  27  (17-59)  U/L


 


ALT  57  (21-72)  U/L


 


Alkaline Phosphatase  61  ()  U/L


 


Total Protein  7.0  (6.3-8.2)  g/dL


 


Albumin  4.2  (3.5-5.0)  g/dL








 Current Medications











Generic Name Dose Route Start Last Admin





  Trade Name Freq  PRN Reason Stop Dose Admin


 


Acetaminophen  650 mg  12/26/18 15:48  





  Tylenol Tab  PO   





  Q6HR PRN   





  Mild Pain or Fever > 100.5   





     





     





     


 


Aspirin  325 mg  12/27/18 09:00  





  Aspirin  PO   





  DAILY [facility]   





     





     





     





     


 


Atorvastatin Calcium  40 mg  12/27/18 09:00  





  Lipitor  PO   





  DAILY [facility]   





     





     





     





     


 


Desvenlafaxine Succinate  100 mg  12/27/18 09:00  





  Pristiq Er  PO   





  DAILY [facility]   





     





     





     





     


 


Gabapentin  300 mg  12/26/18 21:00  12/26/18 21:11





  Neurontin  PO   300 mg





  HS PRETTY   Administration





     





     





     





     


 


Heparin Sodium (Porcine)  0 unit  12/26/18 21:35  12/27/18 03:24





  Heparin  IV   4,000 unit





  PER PROTOCOL PRN   Administration





  Low PTT   





     





  Protocol   





     


 


Heparin Sodium/Sodium Chloride  250 mls @ 10 mls/hr  12/26/18 15:15  12/27/18 03

:24





  25,000 unit/ Sodium Chloride  IV   16.26 units/kg/hr





  .Q24H [facility]   15.85 mls/hr





     Titration





     





  Protocol   





  10.26 UNITS/KG/HR   


 


Insulin Aspart  0 unit  12/26/18 17:30  12/26/18 16:50





  Novolog  SQ   3 unit





  AC-TID PRETTY   Administration





     





     





  Protocol   





     


 


Insulin Detemir  20 unit  12/26/18 21:00  12/26/18 21:12





  Levemir  SQ   20 unit





  HS PRETTY   Administration





     





     





     





     


 


Lamotrigine  200 mg  12/27/18 09:00  





  Lamictal  PO   





  DAILY [facility]   





     





     





     





     


 


Morphine Sulfate  2 mg  12/26/18 15:48  





  Morphine Sulfate (Inj)  IV   





  Q4HR PRN   





  Severe Pain   





     





     





     


 


Naloxone HCl  0.2 mg  12/26/18 15:48  





  Narcan  IV   





  Q2M PRN   





  Opioid Reversal   





     





     





     


 


Nitroglycerin  0.4 mg  12/26/18 15:13  





  Nitrostat  SUBLINGUAL   





  Q5M PRN   





  Chest Pain   





     





     





     


 


Lisdexamfetamine  50 mg  12/27/18 09:00  





  Dimesylate [Vyvanse]  PO   





  QAM PRETTY   





     





     





     





     


 


Pramipexole Dihydrochloride  0.5 mg  12/26/18 16:00  12/26/18 21:12





  Mirapex  PO   0.5 mg





  TID PRETTY   Administration





     





     





     





     








 Intake and Output











 12/26/18 12/27/18 12/27/18





 22:59 06:59 14:59


 


Intake Total 872.5 74.994 


 


Balance 872.5 74.994 


 


Intake:   


 


  Intake, IV Titration 50.5 74.994 





  Amount   


 


    Heparin Sod,Pork in 0.45% 50.5 74.994 





    NaCl 25,000 unit In 0.45   





    % NaCl 1 250ml.bag @ 10.   





    26 UNITS/KG/HR 10 mls/hr   





    IV .Q24H [facility] Rx#:   





    [id number]   


 


  Oral 822  


 


Other:   


 


  # Voids  1 


 


  Weight 92.624 kg  








 





 12/26/18 13:55 





 12/26/18 13:55

## 2018-12-27 NOTE — ECHOF
Referral Reason:Chest pain



MEASUREMENTS

--------

HEIGHT: 175.3 cm

WEIGHT: 92.5 kg

BP: 110/0

RVIDd:   2.3 cm     (< 3.3)

IVSd:   1.1 cm     (0.6 - 1.1)

LVIDd:   3.8 cm     (3.9 - 5.3)

LVPWd:   1.0 cm     (0.6 - 1.1)

IVSs:   1.2 cm

LVIDs:   2.6 cm

LVPWs:   1.2 cm

LAESV Index (A-L):   13.31 ml/m

Ao Diam:   3.5 cm     (2.0 - 3.7)

AV Cusp:   2.5 cm     (1.5 - 2.6)

LA Diam:   2.6 cm     (2.7 - 3.8)

MV EXCURSION:   17.614 mm     (> 18.000)

MV EF SLOPE:   161 mm/s     (70 - 150)

EPSS:   0.8 cm

MV E Jose:   0.57 m/s

MV DecT:   228 ms

MV A Jose:   0.85 m/s

MV E/A Ratio:   0.67 

RAP:   5.00 mmHg

RVSP:   6.54 mmHg







FINDINGS

--------

Sinus rhythm.

This was a technically good study.

The left ventricular size is normal.   Left ventricular wall thickness is normal.   Overall left vent
ricular systolic function is low-normal with, an EF between 50 - 55 %.

The right ventricle is normal in size and function.

The left atrium is normal in size.

The right atrium is normal in size.

xx ml of Lumason was utilized for enhancement of images.

The aortic valve is trileaflet, and appears structurally normal. No aortic stenosis or regurgitation.


Mild mitral annular calcification present.   Mild mitral regurgitation is present.

Mild tricuspid regurgitation present.   The right ventricular systolic pressure, as measured by Doppl
er, is 6.54mmHg.

Pulmonic valve appears structurally normal.

The aortic root size is normal.

Normal inferior vena cava with normal inspiratory collapse consistent with estimated right atrial pre
ssure of  5 mmHg.

The pericardium is normal.



CONCLUSIONS

--------

1. Sinus rhythm.

2. This was a technically good study.

3. The left ventricular size is normal.

4. Left ventricular wall thickness is normal.

5. Overall left ventricular systolic function is low-normal with, an EF between 50 - 55 %.

6. The right ventricle is normal in size and function.

7. The left atrium is normal in size.

8. The right atrium is normal in size.

9. xx ml of Lumason was utilized for enhancement of images.

10. The aortic valve is trileaflet, and appears structurally normal. No aortic stenosis or regurgitat
ion.

11. Mild mitral annular calcification present.

12. Mild mitral regurgitation is present.

13. Mild tricuspid regurgitation present.

14. The right ventricular systolic pressure, as measured by Doppler, is 6.54mmHg.

15. Pulmonic valve appears structurally normal.

16. The aortic root size is normal.

17. Normal inferior vena cava with normal inspiratory collapse consistent with estimated right atrial
 pressure of  5 mmHg.

18. The pericardium is normal.





SONOGRAPHER: Christine Ireland RDCS

## 2018-12-27 NOTE — P.STRESS
- Stress Test Note


Stress Test Results/Findings: 


Exam Performed:  stress echo exercise


Exam Date: 12/27/18


Reason for Exam: Chest Pain





Height: 5 ft 9 in


Weight: 92.624 kg


Protocol: Stress Echo


Stage: 3


Duration of Exercise: 10:19





Resting Heart Rate: 91


Resting Blood Pressure: 109/79


Maximum Achieved Heart Rate: 156


Maximum Achieved Blood Pressure: 156/73


85% PMHR: 149


100% PMHR: 175


METS: 11.5


Technologist Comment: 





Stress Test Results/Findings: 


This is a 43-year-old gentleman was admitted to the hospital with chest pain 

and shortness of breath.  Patient has history of diabetes, hypercholesterolemia 

and family history ischemic heart disease.





Stress data: Baseline EKG showed sinus rhythm with normal [place] interval and QRS 

duration.  Blood pressure at rest is 109/79% of 91.  Patient walked on the 

Tomas protocol for 10 minutes and 19 seconds achieving a maximal heart rate 156

, blood pressure 156/73.  EKGs taken during and after exercise did not reveal 

any changes to sized ischemia.





Echo data: Baseline EKG showed a normal LV function.  Exercise echo images 

showed augmentation of wall motion and thickening in all the segments.  This 

study was done with contrast.





Final impression #1.  Negative stress test #2.  Negative stress echo

## 2018-12-27 NOTE — P.DS
Providers


Date of admission: 


12/26/18 15:19





Expected date of discharge: 12/27/18


Attending physician: 


Celine Arciniega MD





Consults: 





 





12/26/18 15:13


Consult Physician Urgent 


   Consulting Provider: Lucius Velez


   Consult Reason/Comments: chest pain


   Do you want consulting provider notified?: Yes











Primary care physician: 


Tiffanie Talavera MD








- Discharge Diagnosis(es)


(1) Diabetes mellitus


Current Visit: Yes   Status: Acute   





(2) Hyperlipidemia


Current Visit: Yes   Status: Acute   





(3) RLS (restless legs syndrome)


Current Visit: Yes   Status: Acute   





(4) Low testosterone


Current Visit: Yes   Status: Acute   





(5) Depression with anxiety


Current Visit: Yes   Status: Acute   





(6) Chest pain


Current Visit: Yes   Status: Acute   


Hospital Course: 





43-year-old male with PMH of Dillon-Parkinson-White status post ablation at 16, 

hypertension, uncontrolled diabetes mellitus, hyperlipidemia, low testosterone, 

restless leg syndrome, anxiety and depression presents the ED for chest pain.





Patient states that the chest pain started around 12:30 PM as he was sitting at 

his desk watching TV.  Pain was left-sided and intermittent, lasting for a few 

minutes at a time.  Pain is 10 out of 10 in severity.  Patient describes the 

pain as sharp and stabbing in nature.  Patient reported that the pain radiated 

to the left arm which led to some tingling in his left fingers.  He denies any 

alleviating or aggravating factors.  


Patient denies any headaches, lower extremity edema, nausea, vomiting, fever, 

cough, changes in urination or bowel habits.  No changes in appetite or weight.


Patient does however endorse palpitations and shortness of breath that started 

with his chest pain.





Patient reports previous incidents in 2016, initially found to have elevated 

troponin which normalized on repeat blood draw, he was told that this was 

related to his anxiety.





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





Patient reports uncontrolled diabetes, A1c greater than 11 at his PCPs clinic, 

recently started on insulin.


Patient also reports recently being started on Lipitor as his lipid panel was 

extremely elevated.


Patient also reports an impressive family history of early cardiac disease, MIs 

in the 50s on his mother's side.





In the ED, CBC and CMP were unremarkable except for a glucose of 343.  Patient 

is admitted for chest pain, rule out acute coronary syndrome.  He started on 

heparin drip with cardiology on consult.





Initial troponin was less than 0.0123, EKG showing sinus tachycardia.  

Cardiology was consulted and recommended discontinuing the heparin drip and 

obtaining a stress test.





Patient was seen and examined prior to discharge.  No acute events overnight.  

Patient reports complete resolution of his chest pain.  No shortness of breath 

or palpitations.  Patient is looking for to going home.





General: [non toxic], [no distress], [appears at stated age]


Derm: [warm], [dry]


Head: [atraumatic], [normocephalic], [symmetric]


Eyes: [EOMI], [no lid lag], [anicteric sclera]


Mouth: [no lip lesion], [mucus membranes moist]


Cardiovascular: [S1S2 reg], [tachycardia], [positive DP pulse bilateral]


Lungs: [CTA bilateral], [no rhonchi, no rales] , [no accessory muscle use]


Abdominal: [soft], [ nontender to palpation], [no guarding], [no appreciable 

organomegaly]


Ext: [no gross muscle atrophy], [no edema], [no contractures]


Neuro: [no focal neuro deficits]


Psych: [Alert], [oriented], [appropriate affect] 





Assessment and Plan





1. Chest pain: Concerns for ACS with risk factors. Patient is afebrile with no 

leukocytosis.. Troponin < 0.012 x 3, EKG showing sinus tachycardia. CXR is 

negative. Pain management with Tylenol, Nitrostat PRN and Morphine 2 mg IV Q4H 

PRN for breakthrough. Start  mg PO QD. Heparin drip discontinued by 

Cardiology. Telemetry monitoring.  Echo shows EF 50-55% with normal wall 

motion.  Stress test negative. Cardiology cleared for discharge.





2. Diabetes Mellitus: POC glucose 166. Start Levemir 20 units QHS, ISS. 

Hypoglycemic precautions. Accuchecks with meals. 


3. Hyperlipidemia: Continue  mg PO QD and Lipitor 40 mg PO QHS. HEART 

healthy diet. Lipid panel shows T. Chol 148 LDL 77 HDL 33 and .


4. Restless leg syndrome: Stable. Continue Pramipexole 0.5 mg PO TID.


5. Low testosterone: Stable. Continue testosterone biweekly injections.


6. Depression: Stable. Continue Pristiq  mg PO QD.


7. DVT/GI Prophylaxis: Early mobilization.











Pertinent Studies: 





Chest x-ray


Echocardiogram


Stress test


Patient Condition at Discharge: Stable





Plan - Discharge Summary


Discharge Rx Participant: No


New Discharge Prescriptions: 


Continue


   Gabapentin [Neurontin] 300 mg PO HS


   Testosterone Cypionate [Depo-Testosterone] 200 mg IM Q14D


   Desvenlafaxine Succinate [Pristiq] 100 mg PO DAILY


   sitaGLIPtin PHOS/metFORMIN HCL [Janumet 50-1,000 mg Tablet] 1 tab PO BID


   lamoTRIgine [LaMICtal] 200 mg PO DAILY


   Insulin Glargine,Hum.rec.anlog [Toujeo Solostar] 20 units SQ DAILY


   Atorvastatin [Lipitor] 40 mg PO DAILY


   Pramipexole [Mirapex] 0.5 mg PO TID


   Lisdexamfetamine Dimesylate [Vyvanse] 50 mg PO QAM


   Dapagliflozin Propanediol [Farxiga] 10 mg PO DAILY


Discharge Medication List





Desvenlafaxine Succinate [Pristiq] 100 mg PO DAILY 07/25/15 [History]


Gabapentin [Neurontin] 300 mg PO HS 07/25/15 [History]


Testosterone Cypionate [Depo-Testosterone] 200 mg IM Q14D 07/25/15 [History]


Atorvastatin [Lipitor] 40 mg PO DAILY 12/26/18 [History]


Dapagliflozin Propanediol [Farxiga] 10 mg PO DAILY 12/26/18 [History]


Insulin Glargine,Hum.rec.anlog [Toujeo Solostar] 20 units SQ DAILY 12/26/18 [

History]


Lisdexamfetamine Dimesylate [Vyvanse] 50 mg PO QAM 12/26/18 [History]


Pramipexole [Mirapex] 0.5 mg PO TID 12/26/18 [History]


lamoTRIgine [LaMICtal] 200 mg PO DAILY 12/26/18 [History]


sitaGLIPtin PHOS/metFORMIN HCL [Janumet 50-1,000 mg Tablet] 1 tab PO BID 12/26/ 18 [History]








Follow up Appointment(s)/Referral(s): 


Tiffanie Talavera MD [Primary Care Provider] - 1-2 days (PLEASE CALL OFFICE 

TOMORROW FOR HOSPITAL FOLLOW UP APPOINTMENT)


Laurie Rodriguez MD [STAFF PHYSICIAN] - 1 Week


Shaneka Mcbride MD [STAFF PHYSICIAN] - 2 Weeks


Activity/Diet/Wound Care/Special Instructions: 


Diet; Diabetic


Please follow-up with your primary care provider within 1-2 days of discharge.


Please follow-up with your cardiologist within 2 weeks of discharge.


Please take all medications as advised.





TAKE BLOOD SUGARS AT LEAST TWICE A DAY AND RECORD. TAKE TO DR. RODRIGUEZ FOR FURTHER 

ORDERS





RETURN TO ER IF YOU DEVELOP CHEST PAIN, SHORTNESS OF BREATH, NAUSEA, DIZZINESS, 

PAIN DOWN ARM/UP JAW


Discharge Disposition: HOME SELF-CARE

## 2019-01-04 NOTE — ECHOS
Stress Test Results/Findings: 

Exam Performed:  stress echo exercise

Exam Date: 12/27/18

Reason for Exam: Chest Pain



Height: 5 ft 9 in

Weight: 92.624 kg

Protocol: Stress Echo

Stage: 3

Duration of Exercise: 10:19



Resting Heart Rate: 91

Resting Blood Pressure: 109/79

Maximum Achieved Heart Rate: 156

Maximum Achieved Blood Pressure: 156/73

85% PMHR: 149

100% PMHR: 175

METS: 11.5

Technologist Comment: 



Stress Test Results/Findings: 

This is a 43-year-old gentleman was admitted to the hospital with chest pain 
and shortness of breath.  Patient has history of diabetes, hypercholesterolemia 
and family history ischemic heart disease.



Stress data: Baseline EKG showed sinus rhythm with normal [place] interval and QRS 
duration.  Blood pressure at rest is 109/79% of 91.  Patient walked on the 
Tomas protocol for 10 minutes and 19 seconds achieving a maximal heart rate 156
, blood pressure 156/73.  EKGs taken during and after exercise did not reveal 
any changes to sized ischemia.



Echo data: Baseline EKG showed a normal LV function.  Exercise echo images 
showed augmentation of wall motion and thickening in all the segments.  This 
study was done with contrast.



Final impression #1.  Negative stress test #2.  Negative stress echo

MTDD

## 2019-03-16 ENCOUNTER — HOSPITAL ENCOUNTER (OUTPATIENT)
Dept: HOSPITAL 47 - LABWHC1 | Age: 44
Discharge: HOME | End: 2019-03-16
Attending: INTERNAL MEDICINE
Payer: COMMERCIAL

## 2019-03-16 DIAGNOSIS — E11.65: Primary | ICD-10-CM

## 2019-03-16 DIAGNOSIS — Z12.5: ICD-10-CM

## 2019-03-16 DIAGNOSIS — E29.1: ICD-10-CM

## 2019-03-16 LAB
ALBUMIN SERPL-MCNC: 4.3 G/DL (ref 3.8–4.9)
ALBUMIN/GLOB SERPL: 2.15 G/DL (ref 1.6–3.17)
ALP SERPL-CCNC: 64 U/L (ref 41–126)
ALT SERPL-CCNC: 61 U/L (ref 10–49)
ANION GAP SERPL CALC-SCNC: 8 MMOL/L (ref 4–12)
AST SERPL-CCNC: 37 U/L (ref 14–35)
BUN SERPL-SCNC: 17 MG/DL (ref 9–27)
CALCIUM SPEC-MCNC: 9.3 MG/DL (ref 8.7–10.3)
CHLORIDE SERPL-SCNC: 102 MMOL/L (ref 96–109)
CHOLEST SERPL-MCNC: 172 MG/DL (ref 0–200)
CO2 SERPL-SCNC: 29 MMOL/L (ref 21.6–31.8)
ERYTHROCYTE [DISTWIDTH] IN BLOOD BY AUTOMATED COUNT: 5.71 M/UL (ref 4.3–5.9)
ERYTHROCYTE [DISTWIDTH] IN BLOOD: 13.5 % (ref 11.5–15.5)
GLOBULIN SER CALC-MCNC: 2 G/DL (ref 1.6–3.3)
GLUCOSE SERPL-MCNC: 226 MG/DL (ref 70–110)
HBA1C MFR BLD: 15.1 % (ref 4–6)
HCT VFR BLD AUTO: 51.2 % (ref 39–53)
HDLC SERPL-MCNC: 29 MG/DL (ref 40–60)
HGB BLD-MCNC: 16.8 GM/DL (ref 13–17.5)
MCH RBC QN AUTO: 29.4 PG (ref 25–35)
MCHC RBC AUTO-ENTMCNC: 32.8 G/DL (ref 31–37)
MCV RBC AUTO: 89.8 FL (ref 80–100)
PLATELET # BLD AUTO: 366 K/UL (ref 150–450)
POTASSIUM SERPL-SCNC: 4.4 MMOL/L (ref 3.5–5.5)
PROT SERPL-MCNC: 6.3 G/DL (ref 6.2–8.2)
SODIUM SERPL-SCNC: 139 MMOL/L (ref 135–145)
TRIGL SERPL-MCNC: 434 MG/DL (ref 0–149)
WBC # BLD AUTO: 7.3 K/UL (ref 3.8–10.6)

## 2019-03-16 PROCEDURE — 84403 ASSAY OF TOTAL TESTOSTERONE: CPT

## 2019-03-16 PROCEDURE — 84443 ASSAY THYROID STIM HORMONE: CPT

## 2019-03-16 PROCEDURE — 85027 COMPLETE CBC AUTOMATED: CPT

## 2019-03-16 PROCEDURE — 36415 COLL VENOUS BLD VENIPUNCTURE: CPT

## 2019-03-16 PROCEDURE — 80053 COMPREHEN METABOLIC PANEL: CPT

## 2019-03-16 PROCEDURE — 83036 HEMOGLOBIN GLYCOSYLATED A1C: CPT

## 2019-03-16 PROCEDURE — 82043 UR ALBUMIN QUANTITATIVE: CPT

## 2019-03-16 PROCEDURE — 82570 ASSAY OF URINE CREATININE: CPT

## 2019-03-16 PROCEDURE — 80061 LIPID PANEL: CPT

## 2019-03-16 PROCEDURE — 83721 ASSAY OF BLOOD LIPOPROTEIN: CPT
